# Patient Record
Sex: MALE | Race: WHITE | NOT HISPANIC OR LATINO | Employment: OTHER | URBAN - NONMETROPOLITAN AREA
[De-identification: names, ages, dates, MRNs, and addresses within clinical notes are randomized per-mention and may not be internally consistent; named-entity substitution may affect disease eponyms.]

---

## 2017-05-26 ENCOUNTER — OFFICE VISIT (OUTPATIENT)
Dept: CARDIOLOGY | Facility: CLINIC | Age: 72
End: 2017-05-26

## 2017-05-26 VITALS
BODY MASS INDEX: 25.65 KG/M2 | WEIGHT: 183.2 LBS | OXYGEN SATURATION: 97 % | DIASTOLIC BLOOD PRESSURE: 77 MMHG | HEIGHT: 71 IN | HEART RATE: 60 BPM | SYSTOLIC BLOOD PRESSURE: 173 MMHG

## 2017-05-26 DIAGNOSIS — R06.02 SHORTNESS OF BREATH: ICD-10-CM

## 2017-05-26 DIAGNOSIS — I10 ESSENTIAL HYPERTENSION: ICD-10-CM

## 2017-05-26 DIAGNOSIS — Z95.2 H/O AORTIC VALVE REPLACEMENT: ICD-10-CM

## 2017-05-26 DIAGNOSIS — R07.2 PRECORDIAL PAIN: ICD-10-CM

## 2017-05-26 DIAGNOSIS — I25.119 CORONARY ARTERY DISEASE INVOLVING NATIVE CORONARY ARTERY OF NATIVE HEART WITH ANGINA PECTORIS (HCC): ICD-10-CM

## 2017-05-26 DIAGNOSIS — R09.89 BILATERAL CAROTID BRUITS: ICD-10-CM

## 2017-05-26 PROCEDURE — 93000 ELECTROCARDIOGRAM COMPLETE: CPT | Performed by: PHYSICIAN ASSISTANT

## 2017-05-26 PROCEDURE — 99204 OFFICE O/P NEW MOD 45 MIN: CPT | Performed by: PHYSICIAN ASSISTANT

## 2017-05-26 RX ORDER — FLUTICASONE PROPIONATE 50 MCG
2 SPRAY, SUSPENSION (ML) NASAL DAILY
COMMUNITY

## 2017-05-26 RX ORDER — ATORVASTATIN CALCIUM 40 MG/1
40 TABLET, FILM COATED ORAL DAILY
COMMUNITY

## 2017-05-26 RX ORDER — HYDROCODONE BITARTRATE AND ACETAMINOPHEN 7.5; 325 MG/1; MG/1
1 TABLET ORAL EVERY 6 HOURS PRN
COMMUNITY

## 2017-05-26 RX ORDER — ERGOCALCIFEROL 1.25 MG/1
50000 CAPSULE ORAL WEEKLY
COMMUNITY
End: 2019-01-01 | Stop reason: ALTCHOICE

## 2017-05-26 RX ORDER — ALLOPURINOL 100 MG/1
100 TABLET ORAL DAILY
COMMUNITY

## 2017-05-26 RX ORDER — LEVOTHYROXINE SODIUM 0.03 MG/1
25 TABLET ORAL DAILY
COMMUNITY
End: 2019-01-01 | Stop reason: ALTCHOICE

## 2017-05-26 RX ORDER — RAMIPRIL 10 MG/1
10 CAPSULE ORAL DAILY
COMMUNITY
End: 2019-01-01 | Stop reason: ALTCHOICE

## 2017-05-26 RX ORDER — ASPIRIN 81 MG/1
81 TABLET ORAL DAILY
COMMUNITY

## 2017-05-26 RX ORDER — FAMOTIDINE 20 MG/1
20 TABLET, FILM COATED ORAL DAILY
COMMUNITY

## 2017-05-26 RX ORDER — AMLODIPINE BESYLATE 10 MG/1
10 TABLET ORAL DAILY
COMMUNITY
End: 2019-01-01 | Stop reason: DRUGHIGH

## 2017-05-26 RX ORDER — FUROSEMIDE 20 MG/1
20 TABLET ORAL DAILY
COMMUNITY
End: 2019-01-01 | Stop reason: DRUGHIGH

## 2017-08-17 ENCOUNTER — TRANSCRIBE ORDERS (OUTPATIENT)
Dept: CARDIOLOGY | Facility: CLINIC | Age: 72
End: 2017-08-17

## 2017-08-17 DIAGNOSIS — R07.2 PRECORDIAL PAIN: ICD-10-CM

## 2017-08-17 DIAGNOSIS — R06.02 SHORTNESS OF BREATH: ICD-10-CM

## 2017-08-17 DIAGNOSIS — Z95.2 H/O AORTIC VALVE REPLACEMENT: ICD-10-CM

## 2017-08-17 DIAGNOSIS — R09.89 BILATERAL CAROTID BRUITS: ICD-10-CM

## 2017-08-17 DIAGNOSIS — I10 BENIGN ESSENTIAL HTN: ICD-10-CM

## 2017-08-17 DIAGNOSIS — I25.119 CORONARY ARTERY DISEASE INVOLVING NATIVE CORONARY ARTERY OF NATIVE HEART WITH ANGINA PECTORIS (HCC): Primary | ICD-10-CM

## 2017-08-18 ENCOUNTER — TELEPHONE (OUTPATIENT)
Dept: CARDIOLOGY | Facility: CLINIC | Age: 72
End: 2017-08-18

## 2017-08-18 DIAGNOSIS — I10 ESSENTIAL HYPERTENSION: ICD-10-CM

## 2017-08-18 DIAGNOSIS — Z95.2 H/O AORTIC VALVE REPLACEMENT: Primary | ICD-10-CM

## 2017-08-18 DIAGNOSIS — R06.02 SHORTNESS OF BREATH: ICD-10-CM

## 2017-08-18 DIAGNOSIS — R07.2 PRECORDIAL PAIN: ICD-10-CM

## 2017-08-18 NOTE — TELEPHONE ENCOUNTER
Emiliano scheduling called stating pt is scheduled for Echo and stress on Monday and echo order was entered in worn,. Echo was ordered as hospital performed and need to be changed to external. New order has been entered into chart.

## 2017-08-21 ENCOUNTER — HOSPITAL ENCOUNTER (OUTPATIENT)
Dept: CARDIOLOGY | Facility: HOSPITAL | Age: 72
Discharge: HOME | End: 2017-08-21

## 2017-08-21 ENCOUNTER — OUTSIDE FACILITY SERVICE (OUTPATIENT)
Dept: CARDIOLOGY | Facility: CLINIC | Age: 72
End: 2017-08-21

## 2017-08-21 ENCOUNTER — APPOINTMENT (OUTPATIENT)
Dept: CARDIOLOGY | Facility: HOSPITAL | Age: 72
End: 2017-08-21

## 2017-08-21 DIAGNOSIS — I25.119 CORONARY ARTERY DISEASE INVOLVING NATIVE CORONARY ARTERY OF NATIVE HEART WITH ANGINA PECTORIS (HCC): ICD-10-CM

## 2017-08-21 DIAGNOSIS — Z95.2 H/O AORTIC VALVE REPLACEMENT: ICD-10-CM

## 2017-08-21 DIAGNOSIS — I10 BENIGN ESSENTIAL HTN: ICD-10-CM

## 2017-08-21 DIAGNOSIS — R07.2 PRECORDIAL PAIN: ICD-10-CM

## 2017-08-21 DIAGNOSIS — R09.89 BILATERAL CAROTID BRUITS: ICD-10-CM

## 2017-08-21 DIAGNOSIS — R06.02 SHORTNESS OF BREATH: ICD-10-CM

## 2017-08-21 LAB
MAXIMAL PREDICTED HEART RATE: 147 BPM
STRESS TARGET HR: 125 BPM

## 2017-08-21 PROCEDURE — A9500 TC99M SESTAMIBI: HCPCS | Performed by: INTERNAL MEDICINE

## 2017-08-21 PROCEDURE — 93306 TTE W/DOPPLER COMPLETE: CPT | Performed by: INTERNAL MEDICINE

## 2017-08-21 PROCEDURE — 25010000002 REGADENOSON 0.4 MG/5ML SOLUTION: Performed by: INTERNAL MEDICINE

## 2017-08-21 PROCEDURE — 78452 HT MUSCLE IMAGE SPECT MULT: CPT

## 2017-08-21 PROCEDURE — 93880 EXTRACRANIAL BILAT STUDY: CPT | Performed by: INTERNAL MEDICINE

## 2017-08-21 PROCEDURE — 78452 HT MUSCLE IMAGE SPECT MULT: CPT | Performed by: INTERNAL MEDICINE

## 2017-08-21 PROCEDURE — 93306 TTE W/DOPPLER COMPLETE: CPT

## 2017-08-21 PROCEDURE — 93880 EXTRACRANIAL BILAT STUDY: CPT

## 2017-08-21 PROCEDURE — 93018 CV STRESS TEST I&R ONLY: CPT | Performed by: INTERNAL MEDICINE

## 2017-08-21 PROCEDURE — 0 TECHNETIUM SESTAMIBI: Performed by: INTERNAL MEDICINE

## 2017-08-21 PROCEDURE — 93017 CV STRESS TEST TRACING ONLY: CPT

## 2017-08-21 RX ADMIN — TECHNETIUM TC-99M SESTAMIBI 1 DOSE: 1 INJECTION INTRAVENOUS at 08:45

## 2017-08-21 RX ADMIN — REGADENOSON 0.4 MG: 0.08 INJECTION, SOLUTION INTRAVENOUS at 08:45

## 2017-08-24 ENCOUNTER — TELEPHONE (OUTPATIENT)
Dept: CARDIOLOGY | Facility: CLINIC | Age: 72
End: 2017-08-24

## 2017-08-24 NOTE — TELEPHONE ENCOUNTER
"Stress test and echo results back in the office and after several attempts to contact patient with results, he finally called the office back. I discussed with him that stress test indicated ischemia to 3 different areas of the heart and the possible significance of this and as I was told him we needed to schedule a f/u appt. He informed me no. Stated that when he had bypass in the past, he was told that one area was 3/4 of the way blocked. He stated he was 72 years old and wasn't going to have anything else done. I explained to him the possible consequences of not having these areas of ischemia addressed and he stated he \"i know\". After talking with patient about possible risks involved he still refused to schedule a f/u appt. Noel Wallace, PAC aware. PH,LPN  "

## 2019-01-01 ENCOUNTER — OFFICE VISIT (OUTPATIENT)
Dept: CARDIOLOGY | Facility: CLINIC | Age: 74
End: 2019-01-01

## 2019-01-01 ENCOUNTER — TELEPHONE (OUTPATIENT)
Dept: CARDIOLOGY | Facility: CLINIC | Age: 74
End: 2019-01-01

## 2019-01-01 VITALS
BODY MASS INDEX: 19.71 KG/M2 | WEIGHT: 140.8 LBS | HEIGHT: 71 IN | SYSTOLIC BLOOD PRESSURE: 88 MMHG | OXYGEN SATURATION: 97 % | HEART RATE: 96 BPM | DIASTOLIC BLOOD PRESSURE: 56 MMHG

## 2019-01-01 DIAGNOSIS — R07.9 CHEST PAIN, UNSPECIFIED TYPE: Primary | ICD-10-CM

## 2019-01-01 DIAGNOSIS — R11.0 NAUSEA: Primary | ICD-10-CM

## 2019-01-01 DIAGNOSIS — R06.02 SHORTNESS OF BREATH: ICD-10-CM

## 2019-01-01 DIAGNOSIS — I48.0 PAROXYSMAL ATRIAL FIBRILLATION (HCC): ICD-10-CM

## 2019-01-01 DIAGNOSIS — I25.810 CORONARY ARTERY DISEASE INVOLVING CORONARY BYPASS GRAFT OF NATIVE HEART, ANGINA PRESENCE UNSPECIFIED: ICD-10-CM

## 2019-01-01 PROCEDURE — 93000 ELECTROCARDIOGRAM COMPLETE: CPT | Performed by: NURSE PRACTITIONER

## 2019-01-01 PROCEDURE — 99214 OFFICE O/P EST MOD 30 MIN: CPT | Performed by: NURSE PRACTITIONER

## 2019-01-01 RX ORDER — DULOXETIN HYDROCHLORIDE 30 MG/1
CAPSULE, DELAYED RELEASE ORAL DAILY
Refills: 3 | COMMUNITY
Start: 2019-01-01

## 2019-01-01 RX ORDER — INSULIN DETEMIR 100 [IU]/ML
25 INJECTION, SOLUTION SUBCUTANEOUS 2 TIMES DAILY PRN
Refills: 3 | COMMUNITY
Start: 2019-01-01

## 2019-01-01 RX ORDER — ONDANSETRON 4 MG/1
4 TABLET, FILM COATED ORAL EVERY 8 HOURS PRN
Qty: 30 TABLET | Refills: 0 | Status: SHIPPED | OUTPATIENT
Start: 2019-01-01

## 2019-01-01 RX ORDER — AMLODIPINE BESYLATE 5 MG/1
TABLET ORAL DAILY
Refills: 1 | COMMUNITY
Start: 2019-01-01

## 2019-01-01 RX ORDER — ONDANSETRON 4 MG/1
TABLET, FILM COATED ORAL
Qty: 30 TABLET | Refills: 0 | Status: SHIPPED | OUTPATIENT
Start: 2019-01-01

## 2019-01-01 RX ORDER — FUROSEMIDE 40 MG/1
TABLET ORAL DAILY
Refills: 0 | COMMUNITY
Start: 2019-01-01

## 2019-09-30 NOTE — PATIENT INSTRUCTIONS
Inform patient that he needed to be evaluated in the emergency department.  Discussed with family and patient.  Offered to call an ambulance.  Family verbalized they could drive him to the emergency department.  Assist the patient to the car in a wheelchair and was taken directly to the emergency department by family.

## 2019-09-30 NOTE — PROGRESS NOTES
"Subjective     Aleks Gonsales is a 74 y.o. male who presents to day for Chest Pain ([facility] for ER f/u and [facility]); Shortness of Breath; Congestive Heart Failure; Coronary Artery Disease; and Atrial Fibrillation.    CHIEF COMPLIANT  Chief Complaint   Patient presents with   • Chest Pain     [facility] for ER f/u and UK   • Shortness of Breath   • Congestive Heart Failure   • Coronary Artery Disease   • Atrial Fibrillation     Problem List:  1.  Coronary artery disease, status post CABG in April, 2009, inadequate data.  2.  Valvular heart disease with aortic valve replacement during bypass.  The patient had a 25 mm Mosaic Medtronic valve placed.  3.  Systolic dysfunction, with an EF of 30-40% at time of prior procedures as above.  Follow-up echocardiogram suggested preserved systolic function however.  4.  Hypertension  5.  Dyslipidemia  6.  Hypothyroidism  7.  Diabetes mellitus  #8 atrial fibrillation    Active Problems:  Problem List Items Addressed This Visit     None      Visit Diagnoses     Chest pain, unspecified type    -  Primary    Relevant Orders    ECG 12 Lead    Paroxysmal atrial fibrillation (CMS/HCC)        Relevant Medications    amLODIPine (NORVASC) 5 MG tablet    Other Relevant Orders    ECG 12 Lead    Coronary artery disease involving coronary bypass graft of native heart, angina presence unspecified        Relevant Medications    amLODIPine (NORVASC) 5 MG tablet    Shortness of breath              HPI  Patient is a 74-year-old  male who appears significantly men older than whose being seen in the clinic today for follow-up hospitalization and emergency departments at  and [facility].  Patient arrives in clinic today with hypotension 88/56 with heart rate of 96 patient had associated symptoms of chest pressure, shortness of air \"I have no breath\", PND, orthopnea with sleeping on at least 4 pillows at night, palpitations as \"not going fast enough\", severe fatigue, exercise " "intolerance to the point of not being able to walk, and lower extremity edema.  Also complains of severe nausea to the point where it wakes him up at night and cannot sleep.  Other symptoms include falling 3-4 times due to losing his balance due to his back issues as reported by patient.  Patient continue to explain that he had extensive dyspnea with any type of exertion including just standing.  Patient verbalized that he has not really walked in approximately 2 months.  Patient had original stress test 8/21/2017 which was positive with ischemic changes anterior, lateral, and posterior lateral with an ejection fraction of 50%.  During the hospitalization at [facility] patient underwent another stress and echo which the stress test identified \"scars on anterior and distal inferior with an EF of 41%.  The echo reported an EF of 60 to 65% with impaired diastolic filling, normal systolic function, mild to moderate tricuspid regurg.  Patient also reports having [place] spotted fever within the past year in which she is lost 40 pounds since.  Patient does verbalize that he has no appetite whatsoever and that he has been trying to supplement with Glucerna otherwise not eating well.  Reported that his sugars been dropping a lot as well due to his malnutrition.  Patient's hospital cessation also ruled out chances of pulmonary embolus with CT angiography.  Discussed with patient's the combination of the findings from current and previous stress and echo.  Informed patient that was significantly concerned regarding potential for global ischemia.  With hypotension, chest pressure, shortness of air, and abnormal EKG I felt that it is most appropriate for patient to be seen in the emergency department for further evaluation.  To see patient have a left heart catheterization to rule out global ischemia.  He also identified atrial fibrillation on the EKG and is not on any anticoagulation and should be anticoagulated for prevention " of stroke.  Due to increasing Lasix of 80 mg daily on 2 divided doses concerned of actual dehydration patient not being able to be perfused any stenotic areas due to decreased perfusion pressure.  Patient will be taken to the emergency room per family per request, and ambulance was declined.    Chest Pain    Associated symptoms include back pain (severe back pain, hard to stand or walk.  Hasn't walked for months), dizziness, palpitations, shortness of breath (with any activity) and weakness (in legs and generalized).   Shortness of Breath   Associated symptoms include chest pain (Descibed as pressure) and leg swelling.       MEDS  Current Outpatient Medications   Medication Sig Dispense Refill   • allopurinol (ZYLOPRIM) 100 MG tablet Take 100 mg by mouth Daily.     • amLODIPine (NORVASC) 5 MG tablet Daily.  1   • aspirin 81 MG EC tablet Take 81 mg by mouth Daily.     • atorvastatin (LIPITOR) 40 MG tablet Take 40 mg by mouth Daily.     • DULoxetine (CYMBALTA) 30 MG capsule Daily.  3   • famotidine (PEPCID) 20 MG tablet Take 20 mg by mouth Daily.     • furosemide (LASIX) 40 MG tablet Daily.  0   • HYDROcodone-acetaminophen (NORCO) 7.5-325 MG per tablet Take 1 tablet by mouth Every 6 (Six) Hours As Needed for Moderate Pain (4-6).     • LEVEMIR FLEXTOUCH 100 UNIT/ML injection 25 Units 2 (Two) Times a Day As Needed.  3   • metoprolol tartrate (LOPRESSOR) 25 MG tablet Take 25 mg by mouth 2 (Two) Times a Day.     • fluticasone (FLONASE) 50 MCG/ACT nasal spray 2 sprays into each nostril Daily.     • levothyroxine (SYNTHROID, LEVOTHROID) 25 MCG tablet Take 25 mcg by mouth Daily.     • ramipril (ALTACE) 10 MG capsule Take 10 mg by mouth Daily.     • vitamin D (ERGOCALCIFEROL) 43861 UNITS capsule capsule Take 50,000 Units by mouth 1 (One) Time Per Week.       No current facility-administered medications for this visit.      ALLERGIES  Niacin and related    HISTORY  Past Medical History:   Diagnosis Date   • Cancer (CMS/HCC)   "   Bladder   • Diabetes mellitus (CMS/HCC)    • Hyperlipidemia    • Hypertension    • Liver cirrhosis (CMS/HCC)    • Myocardial infarction (CMS/HCC)        Social History     Socioeconomic History   • Marital status:      Spouse name: Not on file   • Number of children: Not on file   • Years of education: Not on file   • Highest education level: Not on file   Tobacco Use   • Smoking status: Never Smoker   • Smokeless tobacco: Never Used   Substance and Sexual Activity   • Alcohol use: No   • Drug use: No   • Sexual activity: Defer       Family History   Problem Relation Age of Onset   • Pancreatic cancer Mother    • No Known Problems Father        Review of Systems   Constitutional: Positive for activity change (not walked for 2 months), appetite change (Lost 40 pounds in a year, no appetite), fatigue (With any activity) and unexpected weight change (lost 40 pounds in a year).   HENT: Positive for congestion (nasal).    Eyes: Positive for visual disturbance (glasses prn).   Respiratory: Positive for shortness of breath (with any activity).    Cardiovascular: Positive for chest pain (Descibed as pressure), palpitations and leg swelling.   Gastrointestinal: Negative.    Endocrine: Negative.    Genitourinary: Positive for difficulty urinating (followed by Dr. Mcghee).   Musculoskeletal: Positive for arthralgias, back pain (severe back pain, hard to stand or walk.  Hasn't walked for months), gait problem (in w/c) and myalgias.   Skin: Negative.    Allergic/Immunologic: Positive for environmental allergies.   Neurological: Positive for dizziness, weakness (in legs and generalized) and light-headedness.   Hematological: Bruises/bleeds easily.   Psychiatric/Behavioral: Negative.        Objective     VITALS: BP (!) 88/56 (BP Location: Left arm, Patient Position: Sitting)   Pulse 96   Ht 180.3 cm (71\")   Wt 63.9 kg (140 lb 12.8 oz)   SpO2 97%   BMI 19.64 kg/m²     LABS:   Lab Results (most recent)     None    "       IMAGING:   No Images in the past 120 days found..    EXAM:  Physical Exam   Constitutional: He is oriented to person, place, and time. He is cooperative. He has a sickly appearance. He appears ill.   HENT:   Head: Normocephalic and atraumatic.   Eyes: EOM are normal. Pupils are equal, round, and reactive to light.   Neck: Trachea normal. Neck supple.   Cardiovascular: Intact distal pulses. An irregular rhythm present. Tachycardia present. Exam reveals no gallop and no friction rub.   Murmur heard.   Systolic murmur is present with a grade of 2/6.  Pulses:       Radial pulses are 2+ on the right side, and 2+ on the left side.        Dorsalis pedis pulses are 1+ on the right side, and 1+ on the left side.   Pulmonary/Chest: Effort normal and breath sounds normal.   Abdominal: Soft. Normal appearance and bowel sounds are normal.   Musculoskeletal: He exhibits edema.   Neurological: He is alert and oriented to person, place, and time.   Skin: Skin is warm, dry and intact. No rash noted. There is pallor.   Psychiatric: He has a normal mood and affect. His speech is normal and behavior is normal. Judgment and thought content normal. Cognition and memory are normal.   Not extremely good historian.  Answers most questions yes or no and was majorly concerned about his nausea.   Vitals reviewed.      Procedure     ECG 12 Lead  Date/Time: 9/30/2019 5:11 PM  Performed by: Antonio Galeano APRN  Authorized by: Antonio Galeano APRN   Comparison: compared with previous ECG from 9/12/2019  Comparison to previous ECG: Previous EKG was with poor quality but appeared to be regular.  Patient currently in atrial fibrillation with PVC, IVCD, right axis deviation, with nonacute ST changes.  Rhythm: atrial fibrillation  Ectopy: unifocal PVCs  Rate: tachycardic  Conduction: left bundle branch block  QRS axis: right  Other findings: non-specific ST-T wave changes    Clinical impression: abnormal EKG  Comments: New onset atrial  fibrillation.          Assessment/Plan    1 patient was recently seen in the ER multiple times for chest pain and admitted to [facility] in which he underwent a stress test and echocardiogram.  Patient stress test was considered no evidence of ischemia with a reduced ejection fraction of 41% that had scars on the anterior wall and distal inferior walls.  Patient also had impaired diastolic filling, normal systolic function, and mild to moderate tricuspid regurg.  However previous stress test that was performed 8/21/2017 was a positive with ischemia noted in the anterior, lateral, and posterior lateral walls with an EF of 50%.  With persistent symptoms and EKG changes patient was sent to the emergency department for emergent evaluation.  2.  Patient also complained of shortness of breath which associated symptom of the chest pain but was also evaluated at [facility] during the mission with a negative CTA of the chest.  3.  Patient remains in heart failure which led to increase his Lasix to 80 mg daily patient's lower extremity edema is approximately 1+ bilaterally.   #4 patient has a significant history of coronary artery disease in which she went under CABG in April 2019 and has a Nordic mosaic bioprosthesis Medtronic valve which was identified normal and the most recent echo the valve area of 1.7.  However with patient's persistent symptoms, significant change and comparisons of echocardiogram/stress test patient was sent to the emergency department for further evaluation.  5.  Patient's EKG also identified atrial fibrillation with rapid ventricular response of 111.  His EKGs were all a sinus rhythm and no documented history that is available of previous atrial fibrillation.  She will need to be considered for anticoagulation due to the atrial fibrillation.  Patient transported to [facility] by private vehicle per family's request.  Denied ambulance.  Will follow-up post hospitalization for reevaluation.      Return For  hospitalization we will follow-up with patient..    Aleks was seen today for chest pain, shortness of breath, congestive heart failure, coronary artery disease and atrial fibrillation.    Diagnoses and all orders for this visit:    Chest pain, unspecified type  -     ECG 12 Lead    Paroxysmal atrial fibrillation (CMS/HCC)  -     ECG 12 Lead    Coronary artery disease involving coronary bypass graft of native heart, angina presence unspecified    Shortness of breath    Patient's Body mass index is 19.64 kg/m². BMI is within normal parameters. No follow-up required..    MEDS ORDERED DURING VISIT:  No orders of the defined types were placed in this encounter.          This document has been electronically signed by Antonio Galeano Jr., APRMARCELO  September 30, 2019 5:38 PM

## 2019-10-01 NOTE — TELEPHONE ENCOUNTER
Mr Gonsales was sent to the Ed from the office for CP, afib, and hypotension.  Patient was later discharged home.  I called for follow-up today, patient still not feeling well.  Discussed left heart catheterization with the patient and he verbalized that he is too weak and does not want to proceed at this time.  Patient also was not able to come back to the office this week for prep for heart cath and education on eliquis.  Wife educated on ELiquis on the phone and samples were set aside for patient's wife to  tomorrow she will be started at the 5 mg dose.  Patient is 63 kg, creatinine 1.9, and is 74 years of age patient does have a history of anemia but with unknown source with negative colonoscopy.  We arrange for patient to follow-up on February 18, 815 because heart cath at that time and reeducate on Eliquis.  And wife advised for go to the emergency department if any new or worsening symptoms.  Patient also complains of continued nausea in which I will send Zofran to the pharmacy.  Had a chadvasc score of 6.  Lupe Madrigal patient's wife will  the Eliquis tomorrow.  Discuss Eliquis at that time with the spouse.